# Patient Record
Sex: MALE | Race: WHITE | NOT HISPANIC OR LATINO | Employment: FULL TIME | URBAN - METROPOLITAN AREA
[De-identification: names, ages, dates, MRNs, and addresses within clinical notes are randomized per-mention and may not be internally consistent; named-entity substitution may affect disease eponyms.]

---

## 2019-09-18 ENCOUNTER — OFFICE VISIT (OUTPATIENT)
Dept: FAMILY MEDICINE CLINIC | Facility: CLINIC | Age: 49
End: 2019-09-18
Payer: COMMERCIAL

## 2019-09-18 VITALS
BODY MASS INDEX: 32.47 KG/M2 | DIASTOLIC BLOOD PRESSURE: 80 MMHG | WEIGHT: 253 LBS | HEART RATE: 80 BPM | SYSTOLIC BLOOD PRESSURE: 132 MMHG | TEMPERATURE: 98 F | OXYGEN SATURATION: 95 % | HEIGHT: 74 IN | RESPIRATION RATE: 18 BRPM

## 2019-09-18 DIAGNOSIS — G89.29 CHRONIC MIDLINE LOW BACK PAIN WITHOUT SCIATICA: Primary | ICD-10-CM

## 2019-09-18 DIAGNOSIS — M54.50 CHRONIC MIDLINE LOW BACK PAIN WITHOUT SCIATICA: Primary | ICD-10-CM

## 2019-09-18 DIAGNOSIS — Z12.11 SCREENING FOR COLON CANCER: ICD-10-CM

## 2019-09-18 DIAGNOSIS — Z12.5 SCREENING FOR PROSTATE CANCER: ICD-10-CM

## 2019-09-18 DIAGNOSIS — Z13.1 SCREENING FOR DIABETES MELLITUS: ICD-10-CM

## 2019-09-18 DIAGNOSIS — L40.9 PSORIASIS: ICD-10-CM

## 2019-09-18 DIAGNOSIS — Z13.220 NEED FOR LIPID SCREENING: ICD-10-CM

## 2019-09-18 PROCEDURE — 99204 OFFICE O/P NEW MOD 45 MIN: CPT | Performed by: FAMILY MEDICINE

## 2019-09-18 RX ORDER — TRIAMCINOLONE ACETONIDE 5 MG/G
CREAM TOPICAL 3 TIMES DAILY
Qty: 30 G | Refills: 2 | Status: SHIPPED | OUTPATIENT
Start: 2019-09-18 | End: 2020-01-03 | Stop reason: SDUPTHER

## 2019-09-18 NOTE — PROGRESS NOTES
BMI Counseling: Body mass index is 32 48 kg/m²  The BMI is above normal  Nutrition recommendations include reducing portion sizes
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
## 2019-09-28 ENCOUNTER — LAB (OUTPATIENT)
Dept: LAB | Facility: CLINIC | Age: 49
End: 2019-09-28
Payer: COMMERCIAL

## 2019-09-28 DIAGNOSIS — Z13.1 SCREENING FOR DIABETES MELLITUS: ICD-10-CM

## 2019-09-28 DIAGNOSIS — Z13.220 NEED FOR LIPID SCREENING: ICD-10-CM

## 2019-09-28 DIAGNOSIS — Z12.5 SCREENING FOR PROSTATE CANCER: ICD-10-CM

## 2019-09-28 LAB
ALBUMIN SERPL BCP-MCNC: 4.2 G/DL (ref 3.5–5)
ALP SERPL-CCNC: 83 U/L (ref 46–116)
ALT SERPL W P-5'-P-CCNC: 28 U/L (ref 12–78)
ANION GAP SERPL CALCULATED.3IONS-SCNC: 7 MMOL/L (ref 4–13)
AST SERPL W P-5'-P-CCNC: 17 U/L (ref 5–45)
BILIRUB SERPL-MCNC: 0.62 MG/DL (ref 0.2–1)
BUN SERPL-MCNC: 12 MG/DL (ref 5–25)
CALCIUM SERPL-MCNC: 9.1 MG/DL (ref 8.3–10.1)
CHLORIDE SERPL-SCNC: 109 MMOL/L (ref 100–108)
CHOLEST SERPL-MCNC: 169 MG/DL (ref 50–200)
CO2 SERPL-SCNC: 26 MMOL/L (ref 21–32)
CREAT SERPL-MCNC: 0.95 MG/DL (ref 0.6–1.3)
GFR SERPL CREATININE-BSD FRML MDRD: 92 ML/MIN/1.73SQ M
GLUCOSE P FAST SERPL-MCNC: 91 MG/DL (ref 65–99)
HDLC SERPL-MCNC: 38 MG/DL (ref 40–60)
LDLC SERPL CALC-MCNC: 105 MG/DL (ref 0–100)
NONHDLC SERPL-MCNC: 131 MG/DL
POTASSIUM SERPL-SCNC: 4.6 MMOL/L (ref 3.5–5.3)
PROT SERPL-MCNC: 7.5 G/DL (ref 6.4–8.2)
PSA SERPL-MCNC: 0.9 NG/ML (ref 0–4)
SODIUM SERPL-SCNC: 142 MMOL/L (ref 136–145)
TRIGL SERPL-MCNC: 132 MG/DL

## 2019-09-28 PROCEDURE — 80061 LIPID PANEL: CPT

## 2019-09-28 PROCEDURE — 80053 COMPREHEN METABOLIC PANEL: CPT

## 2019-09-28 PROCEDURE — 36415 COLL VENOUS BLD VENIPUNCTURE: CPT

## 2019-09-28 PROCEDURE — G0103 PSA SCREENING: HCPCS

## 2019-10-07 ENCOUNTER — EVALUATION (OUTPATIENT)
Dept: PHYSICAL THERAPY | Facility: CLINIC | Age: 49
End: 2019-10-07
Payer: COMMERCIAL

## 2019-10-07 DIAGNOSIS — M54.50 CHRONIC MIDLINE LOW BACK PAIN WITHOUT SCIATICA: ICD-10-CM

## 2019-10-07 DIAGNOSIS — G89.29 CHRONIC MIDLINE LOW BACK PAIN WITHOUT SCIATICA: ICD-10-CM

## 2019-10-07 PROCEDURE — 97162 PT EVAL MOD COMPLEX 30 MIN: CPT | Performed by: PHYSICAL THERAPIST

## 2019-10-07 PROCEDURE — 97110 THERAPEUTIC EXERCISES: CPT | Performed by: PHYSICAL THERAPIST
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
## 2019-10-10 ENCOUNTER — OFFICE VISIT (OUTPATIENT)
Dept: PHYSICAL THERAPY | Facility: CLINIC | Age: 49
End: 2019-10-10
Payer: COMMERCIAL

## 2019-10-10 DIAGNOSIS — M54.50 CHRONIC MIDLINE LOW BACK PAIN WITHOUT SCIATICA: Primary | ICD-10-CM

## 2019-10-10 DIAGNOSIS — G89.29 CHRONIC MIDLINE LOW BACK PAIN WITHOUT SCIATICA: Primary | ICD-10-CM

## 2019-10-10 PROCEDURE — 97110 THERAPEUTIC EXERCISES: CPT

## 2019-10-10 PROCEDURE — 97150 GROUP THERAPEUTIC PROCEDURES: CPT

## 2019-10-10 NOTE — PROGRESS NOTES
Daily Note     Today's date: 10/10/2019  Patient name: Maryam Santo  : 1970  MRN: [id number]  Referring provider: Caryle Finders, MD  Dx:   Encounter Diagnosis     ICD-10-CM    1  Chronic midline low back pain without sciatica M54 5     G89 29                   Subjective: Patient reports usual pain arriving to therapy today  Objective: See treatment diary below      Assessment: Tolerated treatment well  Intiated exercise POC this visit to tolerance  He did complain of radiating sx's into right arm with the UE lifts with red medicine ball which diminished with completion  Patient reported slight pain relief leaving therapy today  Will add in more exercises next  Visit  Patient demonstrated fatigue post treatment, exhibited good technique with therapeutic exercises and would benefit from continued PT      Plan: Continue per plan of care  Progress treatment as tolerated         Precautions: asthma      Manual  10- (IE) 10/10           MET to correct L Posterior innominate rotation NV            Rotational mobs at T8- T12 and L2/L3 NV            Piriformis pin and stretch on L NV RA                                         Exercise Diary  10- (IE) 10/10           NuStep 6 mins NV 6 min                        Manual first:  Laying:             - PPT 10 x 10" NV 10x10"           - TA  10 x 10" NV 10x10"           - TA with alt march 20 x 2" ea NV 20x  2" ea           - TA with MB lift overhead 2 x 10 RMB NV 2x10  RMB           - Sciatic N glide on L 10 x 2" NV 10x 2"                        TB: (Maintain TA contraction)             - rows             - pulldowns             - thoracic antirotation holds                          Standing:             - TB hip abd with ext             - TB hip ext             - front lunges             - lateral lunges                              Modalities  10-7 (IE)            Cryo as needed

## 2019-10-14 ENCOUNTER — OFFICE VISIT (OUTPATIENT)
Dept: PHYSICAL THERAPY | Facility: CLINIC | Age: 49
End: 2019-10-14
Payer: COMMERCIAL

## 2019-10-14 DIAGNOSIS — M54.50 CHRONIC MIDLINE LOW BACK PAIN WITHOUT SCIATICA: Primary | ICD-10-CM

## 2019-10-14 DIAGNOSIS — G89.29 CHRONIC MIDLINE LOW BACK PAIN WITHOUT SCIATICA: Primary | ICD-10-CM

## 2019-10-14 PROCEDURE — 97140 MANUAL THERAPY 1/> REGIONS: CPT

## 2019-10-14 PROCEDURE — 97112 NEUROMUSCULAR REEDUCATION: CPT

## 2019-10-14 PROCEDURE — 97110 THERAPEUTIC EXERCISES: CPT

## 2019-10-14 NOTE — PROGRESS NOTES
Daily Note     Today's date: 10/14/2019  Patient name: Zeenat Paula  : 1970  MRN: [id number]  Referring provider: Suma Littlejohn MD  Dx:   Encounter Diagnosis     ICD-10-CM    1  Chronic midline low back pain without sciatica M54 5     G89 29            [id number]   Pt was 1:1 with Jude Reagan DPT from 4:30-4:50pm and was 1:1 with treating clinician for rest of session  Subjective: Patient states he is doing okay today  Reports 3/10 pain in lumbar region today  Objective: See treatment diary below      Assessment: Tolerated treatment well  Progressed patient through exercises this visit  Introduced standing exercises with good tolerance and no increase in sx's  Provided cues for correct technique with hip extension exercise due to compensation of trunk flexion  Also introduced theraband exercise this visit, he did require postural cuing  Noted feeling good when leaving therapy today  Patient demonstrated fatigue post treatment, exhibited good technique with therapeutic exercises and would benefit from continued PT      Plan: Continue per plan of care  Progress treatment as tolerated         Precautions: asthma      Manual  10-7 (IE) 10/10 10/14          MET to correct L Posterior innominate rotation NV            Rotational mobs at T8- T12 and L2/L3 NV            Piriformis pin and stretch on L NV RA RA                                        Exercise Diary  10-7 (IE) 10/10 10/14          NuStep 6 mins NV 6 min 8 min                       Manual first:  Laying:             - PPT 10 x 10" NV 10x10" 10x10"          - TA  10 x 10" NV 10x10" 10x10"          - TA with alt march 20 x 2" ea NV 20x  2" ea 20x  2"  ea          - TA with MB lift overhead 2 x 10 RMB NV 2x10  RMB 3x10  RMB          - Sciatic N glide on L 10 x 2" NV 10x 2" 10x2"                       TB: (Maintain TA contraction)             - rows   GTB  2x10          - pulldowns   GTB  2x10          - thoracic antirotation holds   NV Standing:             - TB hip abd with ext   2x10  GTB          - TB hip ext   2x10  GTB          - front lunges   x10  ea          - lateral lunges   x10ea                           Modalities  10-7 (IE)            Cryo as needed

## 2019-10-17 ENCOUNTER — OFFICE VISIT (OUTPATIENT)
Dept: PHYSICAL THERAPY | Facility: CLINIC | Age: 49
End: 2019-10-17
Payer: COMMERCIAL

## 2019-10-17 DIAGNOSIS — M54.50 CHRONIC MIDLINE LOW BACK PAIN WITHOUT SCIATICA: Primary | ICD-10-CM

## 2019-10-17 DIAGNOSIS — G89.29 CHRONIC MIDLINE LOW BACK PAIN WITHOUT SCIATICA: Primary | ICD-10-CM

## 2019-10-17 PROCEDURE — 97140 MANUAL THERAPY 1/> REGIONS: CPT | Performed by: PHYSICAL THERAPIST

## 2019-10-17 PROCEDURE — 97110 THERAPEUTIC EXERCISES: CPT | Performed by: PHYSICAL THERAPIST

## 2019-10-17 NOTE — PROGRESS NOTES
Daily Note     Today's date: 10/17/2019  Patient name: Marlene Jordan  : 1970  MRN: [id number]  Referring provider: Yaritza Campbell MD  Dx:   Encounter Diagnosis     ICD-10-CM    1  Chronic midline low back pain without sciatica M54 5     G89 29            Subjective: States that he's feeling good today, notices improvement  Objective: See treatment diary below      Assessment: Completed exercise with correct technique and without reports of pain  Demonstrated moderate fatigue after exercise  Pt would benefit from continued PT services to reduce pain and increase level of function  Plan: Continue per plan of care  Progress treatment as tolerated         Precautions: asthma      Manual  10- (IE) 10/10 10/14 10/17         MET to correct L Posterior innominate rotation NV            Rotational mobs at T8- T12 and L2/L3 NV   MM         Piriformis pin and stretch on L NV RA RA MM                                       Exercise Diary  10- (IE) 10/10 10/14 10/17         NuStep 6 mins NV 6 min 8 min Bike, 5'                      Manual first:  Laying:             - PPT 10 x 10" NV 10x10" 10x10" HEP         - TA  10 x 10" NV 10x10" 10x10" HEP         - TA with alt march 20 x 2" ea NV 20x  2" ea 20x  2"  ea 20x2" ea         - TA with MB lift overhead 2 x 10 RMB NV 2x10  RMB 3x10  RMB 3x10 YMB         - Sciatic N glide on L 10 x 2" NV 10x 2" 10x2" 10x2"                      TB: (Maintain TA contraction)             - rows   GTB  2x10 GTB 2x10         - pulldowns   GTB  2x10 GTB 2x10         - thoracic antirotation holds   NV GTB 10x5" each                      Standing:             - TB hip abd with ext   2x10  GTB 2x10 BTB         - TB hip ext   2x10  GTB 2x10 BTB         - front lunges   x10  ea x10 ea         - lateral lunges   x10ea x10 ea                          Modalities  10-7 (IE)            Cryo as needed

## 2019-10-21 ENCOUNTER — OFFICE VISIT (OUTPATIENT)
Dept: PHYSICAL THERAPY | Facility: CLINIC | Age: 49
End: 2019-10-21
Payer: COMMERCIAL

## 2019-10-21 DIAGNOSIS — M54.50 CHRONIC MIDLINE LOW BACK PAIN WITHOUT SCIATICA: Primary | ICD-10-CM

## 2019-10-21 DIAGNOSIS — G89.29 CHRONIC MIDLINE LOW BACK PAIN WITHOUT SCIATICA: Primary | ICD-10-CM

## 2019-10-21 PROCEDURE — 97110 THERAPEUTIC EXERCISES: CPT | Performed by: PHYSICAL THERAPIST

## 2019-10-21 PROCEDURE — 97140 MANUAL THERAPY 1/> REGIONS: CPT | Performed by: PHYSICAL THERAPIST

## 2019-10-21 NOTE — PROGRESS NOTES
Daily Note     Today's date: 10/21/2019  Patient name: Dontrell Chavez  : 1970  MRN: [id number]  Referring provider: Genny Grady MD  Dx:   Encounter Diagnosis     ICD-10-CM    1  Chronic midline low back pain without sciatica M54 5     G89 29            Subjective: States that his back is feeling okay today  Objective: See treatment diary below      Assessment: Able to complete exercise progressions without reports of pain and demonstrated moderate fatigue after exercise  Pt would benefit from continued PT services to reduce pain and increase level of function  Plan: Continue per plan of care  Progress treatment as tolerated         Precautions: asthma      Manual  10- (IE) 10/10 10/14 10/17 10/21        MET to correct L Posterior innominate rotation NV            Rotational mobs at T8- T12 and L2/L3 NV   MM MM        Piriformis pin and stretch on L NV RA RA MM                                       Exercise Diary  10- (IE) 10/10 10/14 10/17 10/21        NuStep 6 mins NV 6 min 8 min Bike, 5'                      Manual first:  Laying:             - PPT 10 x 10" NV 10x10" 10x10" HEP         - TA  10 x 10" NV 10x10" 10x10" HEP         - TA with alt march 20 x 2" ea NV 20x  2" ea 20x  2"  ea 20x2" ea 20"x2 ea        - TA with MB lift overhead 2 x 10 RMB NV 2x10  RMB 3x10  RMB 3x10 YMB 3x10 YMB        - Sciatic N glide on L 10 x 2" NV 10x 2" 10x2" 10x2" 10x2"        TA w/ alt UE     20"x2        TB: (Maintain TA contraction)             - rows   GTB  2x10 GTB 2x10 GTB 2x10        - pulldowns   GTB  2x10 GTB 2x10 GTB 2x10        - thoracic antirotation holds   NV GTB 10x5" each GTB 15x5" each                     Standing:             - TB hip abd with ext   2x10  GTB 2x10 BTB 2x10x3" GTB        - TB hip ext   2x10  GTB 2x10 BTB 2x10x3" GTB        - front lunges   x10  ea x10 ea W/ BMB x10 ea        - lateral lunges   x10ea x10 ea W/ BMB x10 ea                         Modalities  10- (IE) Cryo as needed

## 2019-10-22 ENCOUNTER — OFFICE VISIT (OUTPATIENT)
Dept: GASTROENTEROLOGY | Facility: CLINIC | Age: 49
End: 2019-10-22
Payer: COMMERCIAL

## 2019-10-22 VITALS
SYSTOLIC BLOOD PRESSURE: 132 MMHG | HEIGHT: 74 IN | HEART RATE: 82 BPM | DIASTOLIC BLOOD PRESSURE: 76 MMHG | WEIGHT: 251.8 LBS | BODY MASS INDEX: 32.31 KG/M2

## 2019-10-22 DIAGNOSIS — Z12.11 SCREENING FOR COLON CANCER: Primary | ICD-10-CM

## 2019-10-22 PROCEDURE — 99204 OFFICE O/P NEW MOD 45 MIN: CPT | Performed by: INTERNAL MEDICINE
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
## 2019-10-23 ENCOUNTER — TELEPHONE (OUTPATIENT)
Dept: GASTROENTEROLOGY | Facility: CLINIC | Age: 49
End: 2019-10-23

## 2019-10-24 ENCOUNTER — TELEPHONE (OUTPATIENT)
Dept: GASTROENTEROLOGY | Facility: CLINIC | Age: 49
End: 2019-10-24

## 2019-10-24 ENCOUNTER — OFFICE VISIT (OUTPATIENT)
Dept: PHYSICAL THERAPY | Facility: CLINIC | Age: 49
End: 2019-10-24
Payer: COMMERCIAL

## 2019-10-24 DIAGNOSIS — M54.50 CHRONIC MIDLINE LOW BACK PAIN WITHOUT SCIATICA: Primary | ICD-10-CM

## 2019-10-24 DIAGNOSIS — G89.29 CHRONIC MIDLINE LOW BACK PAIN WITHOUT SCIATICA: Primary | ICD-10-CM

## 2019-10-24 PROCEDURE — 97140 MANUAL THERAPY 1/> REGIONS: CPT | Performed by: PHYSICAL THERAPIST

## 2019-10-24 PROCEDURE — 97110 THERAPEUTIC EXERCISES: CPT | Performed by: PHYSICAL THERAPIST

## 2019-10-24 PROCEDURE — 97112 NEUROMUSCULAR REEDUCATION: CPT | Performed by: PHYSICAL THERAPIST

## 2019-10-24 NOTE — PROGRESS NOTES
Daily Note     Today's date: 10/24/2019  Patient name: Ijeoma Moody  : 1970  MRN: [id number]  Referring provider: Margurite Cabot, MD  Dx:   Encounter Diagnosis     ICD-10-CM    1  Chronic midline low back pain without sciatica M54 5     G89 29                   Subjective: Patient reports his lower back is feeling better; however, his lower thoracic spine is feeling a little worse  Objective: See treatment diary below      Assessment: Progressed exercises this session to include increased sets of strengthening exercises and progressed resistance on standing core stabilization exercises  Tolerated treatment well  Patient demonstrated fatigue post treatment, exhibited good technique with therapeutic exercises and would benefit from continued PT  Plan: Progress treatment as tolerated         Precautions: asthma      Manual  10- (IE) 10/10 10/14 10/17 10/21 10-24       MET to correct L Posterior innominate rotation NV            Rotational mobs at T8- T12 and L2/L3 NV   MM MM JG grade IV       Piriformis pin and stretch on L NV RA RA MM                                       Exercise Diary  10-7 (IE) 10/10 10/14 10/17 10/21 10-24       NuStep 6 mins NV 6 min 8 min Bike, 5'  8 mins, L6 Add UE this visit                   Manual first:  Laying:             - PPT 10 x 10" NV 10x10" 10x10" HEP         - TA  10 x 10" NV 10x10" 10x10" HEP         - TA with alt march 20 x 2" ea NV 20x  2" ea 20x  2"  ea 20x2" ea 20"x2 ea NV       - TA with MB lift overhead 2 x 10 RMB NV 2x10  RMB 3x10  RMB 3x10 YMB 3x10 YMB NV       - Sciatic N glide on L 10 x 2" NV 10x 2" 10x2" 10x2" 10x2" NV       TA w/ alt UE     20"x2 NV                    TB: (Maintain TA contraction)             - rows   GTB  2x10 GTB 2x10 GTB 2x10 Strong: 3 x 10 15#       - pulldowns   GTB  2x10 GTB 2x10 GTB 2x10 Cookie: 3 x 10 15#       - thoracic antirotation holds   NV GTB 10x5" each GTB 15x5" each Strong: 15x 5" hold 5# Standing:             - TB hip abd with ext   2x10  GTB 2x10 BTB 2x10x3" GTB 3 x 10 ea GTB       - TB hip ext   2x10  GTB 2x10 BTB 2x10x3" GTB 3 x 10 ea GTB       - front lunges   x10  ea x10 ea W/ BMB x10 ea 2 x 10 ea with BMB       - lateral lunges   x10ea x10 ea W/ BMB x10 ea 2 x 10 ea with BMB                        Modalities  10-7 (IE)            Cryo as needed

## 2019-10-28 ENCOUNTER — OFFICE VISIT (OUTPATIENT)
Dept: PHYSICAL THERAPY | Facility: CLINIC | Age: 49
End: 2019-10-28
Payer: COMMERCIAL

## 2019-10-28 DIAGNOSIS — M54.50 CHRONIC MIDLINE LOW BACK PAIN WITHOUT SCIATICA: Primary | ICD-10-CM

## 2019-10-28 DIAGNOSIS — G89.29 CHRONIC MIDLINE LOW BACK PAIN WITHOUT SCIATICA: Primary | ICD-10-CM

## 2019-10-28 PROCEDURE — 97112 NEUROMUSCULAR REEDUCATION: CPT | Performed by: PHYSICAL THERAPIST

## 2019-10-28 PROCEDURE — 97140 MANUAL THERAPY 1/> REGIONS: CPT | Performed by: PHYSICAL THERAPIST

## 2019-10-28 NOTE — PROGRESS NOTES
Daily Note     Today's date: 10/28/2019  Patient name: La Kidd  : 1970  MRN: [id number]  Referring provider: Isa Fajardo MD  Dx:   Encounter Diagnosis     ICD-10-CM    1  Chronic midline low back pain without sciatica M54 5     G89 29                   Subjective: Patient reports he was able to perform his home improvements this weekend without much discomfort in his lower back  Pt states his lower thoracic spine is causing him more pain then his lower back recently  Objective: See treatment diary below      Assessment: Progressed exercises this session on the [employer] machine to focus on thoraco-lumbar dissociation  Pt required mild verbal and tactile cues to maintain florence lumbar spine while moving thoracic spine  Improvement in form noted during front and lateral lunges  Pt unable to complete all exercises this date due to his personal time constraints  Tolerated treatment well  Patient demonstrated fatigue post treatment and would benefit from continued PT      Plan: Progress treatment as tolerated         Precautions: asthma      Manual  10- (IE) 10/10 10/14 10/17 10/21 10-24 10-28      MET to correct L Posterior innominate rotation NV            Rotational mobs at T8- T12 and L2/L3 NV   MM MM JG grade IV JG grade IV      Piriformis pin and stretch on L NV RA RA MM                                       Exercise Diary  10- (IE) 10/10 10/14 10/17 10/21 10-24 10-28      NuStep 6 mins NV 6 min 8 min Bike, 5'  8 mins, L6 8mins, L6                   Manual first:  Laying:             - PPT 10 x 10" NV 10x10" 10x10" HEP         - TA  10 x 10" NV 10x10" 10x10" HEP         - TA with alt march 20 x 2" ea NV 20x  2" ea 20x  2"  ea 20x2" ea 20"x2 ea NV       - TA with MB lift overhead 2 x 10 RMB NV 2x10  RMB 3x10  RMB 3x10 YMB 3x10 YMB NV       - Sciatic N glide on L 10 x 2" NV 10x 2" 10x2" 10x2" 10x2" NV       TA w/ alt UE     20"x2 NV                    TB: (Maintain TA contraction)             - rows   GTB  2x10 GTB 2x10 GTB 2x10 Busy: 3 x 10 15# Busy: 3 x 10 25#      - pulldowns   GTB  2x10 GTB 2x10 GTB 2x10 Cookie: 3 x 10 15# Busy: 3 x 10 25#      - thoracic antirotation holds   NV GTB 10x5" each GTB 15x5" each Busy: 15x 5" hold 5# DC      - thoracic antirotation walks out (5' out, 5' in)       Busy: 10 x ea 15#      - Cookie thoracic rotation (florence lumbar)       2 x 10 8 #                                Standing:             - TB hip abdwith ext   2x10  GTB 2x10 BTB 2x10x3" GTB 3 x 10 ea GTB NV      - TB hip ext    2x10  GTB 2x10 BTB 2x10x3" GTB 3 x 10 ea GTB NV      - front lunges   x10  ea x10 ea W/ BMB x10 ea 2 x 10 ea with BMB 2 x 10 ea with BMB      - lateral lunges   x10ea x10 ea W/ BMB x10 ea 2 x 10 ea with BMB 2 x 10 ea with BMB                       Modalities  10-7 (IE)            Cryo as needed                                          * 1:1 time this date from 4:15pm- 4:50pm only

## 2019-10-31 ENCOUNTER — OFFICE VISIT (OUTPATIENT)
Dept: PHYSICAL THERAPY | Facility: CLINIC | Age: 49
End: 2019-10-31
Payer: COMMERCIAL

## 2019-10-31 DIAGNOSIS — G89.29 CHRONIC MIDLINE LOW BACK PAIN WITHOUT SCIATICA: Primary | ICD-10-CM

## 2019-10-31 DIAGNOSIS — M54.50 CHRONIC MIDLINE LOW BACK PAIN WITHOUT SCIATICA: Primary | ICD-10-CM

## 2019-10-31 PROCEDURE — 97140 MANUAL THERAPY 1/> REGIONS: CPT | Performed by: PHYSICAL THERAPIST

## 2019-10-31 PROCEDURE — 97112 NEUROMUSCULAR REEDUCATION: CPT

## 2019-10-31 PROCEDURE — 97110 THERAPEUTIC EXERCISES: CPT

## 2019-10-31 NOTE — PROGRESS NOTES
Daily Note     Today's date: 10/31/2019  Patient name: Marlene Jordan  : 1970  MRN: [id number]  Referring provider: Yaritza Campbell MD  Dx:   Encounter Diagnosis     ICD-10-CM    1  Chronic midline low back pain without sciatica M54 5     G89 29        Start Time:   Stop Time: 1838  Total time in clinic (min): 53 minutes   1 on 1 time from 6:00pm to 6:30pm, 6:30pm to 06:38pm  JG time with mobs  Subjective: Pt reported feeling pretty good having no pain currently in his lower back  Objective: See treatment diary below      Assessment:  Continued with treatment plan, Patient able to complete all exercises with proper technique and form  No pain mentioned with exercises performed  JG performed mobs on patient,  Tolerated treatment well  Patient demonstrated fatigue post treatment, exhibited good technique with therapeutic exercises and would benefit from continued PT      Plan: Continue per plan of care        Precautions: asthma      Manual  10- (IE) 10/10 10/14 10/17 10/21 10-24 10-28 10/31     MET to correct L Posterior innominate rotation NV            Rotational mobs at T8- T12 and L2/L3 NV   MM MM JG grade IV JG grade IV 8 min JG      Piriformis pin and stretch on L NV RA RA MM                                       Exercise Diary  10- (IE) 10/10 10/14 10/17 10/21 10-24 10-28 10/31    NuStep 6 mins NV 6 min 8 min Bike, 5'  8 mins, L6 8mins, L6 L6 8 min                 Manual first:  Laying:            - PPT 10 x 10" NV 10x10" 10x10" HEP        - TA  10 x 10" NV 10x10" 10x10" HEP        - TA with alt march 20 x 2" ea NV 20x  2" ea 20x  2"  ea 20x2" ea 20"x2 ea NV      - TA with MB lift overhead 2 x 10 RMB NV 2x10  RMB 3x10  RMB 3x10 YMB 3x10 YMB NV      - Sciatic N glide on L 10 x 2" NV 10x 2" 10x2" 10x2" 10x2" NV      TA w/ alt UE     20"x2 NV                  TB: (Maintain TA contraction)            - rows   GTB  2x10 GTB 2x10 GTB 2x10 Cookie: 3 x 10 15# Cookie: 3 x 10 25# [place] 3x 10 25# - pulldowns   GTB  2x10 GTB 2x10 GTB 2x10 [place]: 3 x 10 15# [place]: 3 x 10 25# [place] 3x 10 25#     - thoracic antirotation holds   NV GTB 10x5" each GTB 15x5" each Cookie: 15x 5" hold 5# DC DC    - thoracic antirotation walks out (5' out, 5' in)       [place]: 10 x ea 15# [place] 10x 15#    - [place] thoracic rotation (florence lumbar)       2 x 10 8 #                             Standing:            - TB hip abdwith ext   2x10  GTB 2x10 BTB 2x10x3" GTB 3 x 10 ea GTB NV     - TB hip ext    2x10  GTB 2x10 BTB 2x10x3" GTB 3 x 10 ea GTB NV     - front lunges   x10  ea x10 ea W/ BMB x10 ea 2 x 10 ea with BMB 2 x 10 ea with BMB 2x 10 ea BMB    - lateral lunges   x10ea x10 ea W/ BMB x10 ea 2 x 10 ea with BMB 2 x 10 ea with BMB 2x 10 BMB                    Modalities  10-7 (IE)            Cryo as needed

## 2019-11-04 ENCOUNTER — EVALUATION (OUTPATIENT)
Dept: PHYSICAL THERAPY | Facility: CLINIC | Age: 49
End: 2019-11-04
Payer: COMMERCIAL

## 2019-11-04 DIAGNOSIS — G89.29 CHRONIC MIDLINE LOW BACK PAIN WITHOUT SCIATICA: Primary | ICD-10-CM

## 2019-11-04 DIAGNOSIS — M54.50 CHRONIC MIDLINE LOW BACK PAIN WITHOUT SCIATICA: Primary | ICD-10-CM

## 2019-11-04 PROCEDURE — 97110 THERAPEUTIC EXERCISES: CPT | Performed by: PHYSICAL THERAPIST

## 2019-11-04 PROCEDURE — 97112 NEUROMUSCULAR REEDUCATION: CPT | Performed by: PHYSICAL THERAPIST
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
## 2019-11-07 ENCOUNTER — LAB REQUISITION (OUTPATIENT)
Dept: LAB | Facility: HOSPITAL | Age: 49
End: 2019-11-07
Payer: COMMERCIAL

## 2019-11-07 ENCOUNTER — APPOINTMENT (OUTPATIENT)
Dept: PHYSICAL THERAPY | Facility: CLINIC | Age: 49
End: 2019-11-07
Payer: COMMERCIAL

## 2019-11-07 DIAGNOSIS — K52.839 MICROSCOPIC COLITIS, UNSPECIFIED: ICD-10-CM

## 2019-11-07 PROCEDURE — 88305 TISSUE EXAM BY PATHOLOGIST: CPT | Performed by: PATHOLOGY

## 2019-11-08 ENCOUNTER — TELEPHONE (OUTPATIENT)
Dept: GASTROENTEROLOGY | Facility: CLINIC | Age: 49
End: 2019-11-08

## 2019-11-08 NOTE — TELEPHONE ENCOUNTER
----- Message from Rita Pina MD sent at 11/8/2019 10:44 AM EST -----  pls tell him the polyp was benign; pls tell him the colon lining looked normal under the microscope

## 2019-11-11 ENCOUNTER — APPOINTMENT (OUTPATIENT)
Dept: PHYSICAL THERAPY | Facility: CLINIC | Age: 49
End: 2019-11-11
Payer: COMMERCIAL

## 2019-11-14 ENCOUNTER — APPOINTMENT (OUTPATIENT)
Dept: PHYSICAL THERAPY | Facility: CLINIC | Age: 49
End: 2019-11-14
Payer: COMMERCIAL

## 2019-11-18 ENCOUNTER — APPOINTMENT (OUTPATIENT)
Dept: PHYSICAL THERAPY | Facility: CLINIC | Age: 49
End: 2019-11-18
Payer: COMMERCIAL

## 2019-11-21 ENCOUNTER — APPOINTMENT (OUTPATIENT)
Dept: PHYSICAL THERAPY | Facility: CLINIC | Age: 49
End: 2019-11-21
Payer: COMMERCIAL

## 2020-01-03 DIAGNOSIS — L40.9 PSORIASIS: ICD-10-CM

## 2020-01-03 RX ORDER — TRIAMCINOLONE ACETONIDE 5 MG/G
CREAM TOPICAL 3 TIMES DAILY
Qty: 30 G | Refills: 0 | Status: SHIPPED | OUTPATIENT
Start: 2020-01-03

## 2020-03-10 DIAGNOSIS — L40.9 PSORIASIS: Primary | ICD-10-CM

## 2020-07-02 ENCOUNTER — TELEMEDICINE (OUTPATIENT)
Dept: DERMATOLOGY | Facility: CLINIC | Age: 50
End: 2020-07-02
Payer: COMMERCIAL

## 2020-07-02 DIAGNOSIS — L40.9 PSORIASIS: Primary | ICD-10-CM

## 2020-07-02 PROCEDURE — 99204 OFFICE O/P NEW MOD 45 MIN: CPT | Performed by: DERMATOLOGY

## 2020-07-02 RX ORDER — HALOBETASOL PROPIONATE 0.05 %
OINTMENT (GRAM) TOPICAL
Qty: 50 G | Refills: 3 | Status: SHIPPED | OUTPATIENT
Start: 2020-07-02 | End: 2020-07-12

## 2020-07-02 RX ORDER — CALCIPOTRIENE 50 UG/G
OINTMENT TOPICAL
Qty: 60 G | Refills: 11 | Status: SHIPPED | OUTPATIENT
Start: 2020-07-02 | End: 2020-08-03
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
## 2020-07-02 NOTE — PATIENT INSTRUCTIONS
PSORIASIS    Assessment and Plan:  Based on a thorough discussion of this condition and the management approach to it (including a comprehensive discussion of the known risks, side effects and potential benefits of treatment), the patient (family) agrees to implement the following specific plan:   Start Halobetasol:  Apply topically to elbows and knees twice a day, Monday through Friday only for one month, do not use on the weekends   Start Calcipotriene: Apply topically twice a day to elbows and knees every Saturday and Sunday for one month   Follow up in the office in 4-6 weeks     Psoriasis is a chronic inflammatory condition that causes the body to make new skin cells in days rather than weeks  As these cells pile up on the surface of the skin, you may see thick, scaly patches of thickened skin  Psoriasis affects 2-4% of males and females  It can start at any age including childhood, with peaks of onset at 15-25 years and 50-60 years  It tends to persist lifelong, fluctuating in extent and severity  It is particularly common in Caucasians but may affect people of any race  About one-third of patients with psoriasis have family members with psoriasis  Psoriasis is multifactorial  It is classified as an immune-mediated inflammatory disease (IMID)  Genetic factors are important and influence the type of psoriasis and response to treatment  What are the signs and symptoms of psoriasis? There are many different types of psoriasis that each have present uniquely  The types of psoriasis include:    Plaque psoriasis: About 80% to 90% of people who have psoriasis develop this type  When plaque psoriasis appears, you may see:  Plaque psoriasis usually presents with symmetrically distributed, red, scaly plaques with well-defined edges  The scale is typically silvery white, except in skin folds where the plaques often appear shiny and they may have a moist peeling surface   The most common sites are scalp, elbows and knees, but any part of the skin can be involved  The plaques are usually very persistent without treatment  Itch is mostly mild but may be severe in some patients, leading to scratching and lichenification (thickened leathery skin with increased skin markings)  Painful skin cracks or fissures may occur  When psoriatic plaques clear up, they may leave brown or pale marks that can be expected to fade over several months  Guttate psoriasis: When someone gets this type of psoriasis, you often see tiny bumps appear on the skin quite suddenly  The bumps tend to cover much of the torso, legs, and arms  Sometimes, the bumps also develop on the face, scalp, and ears  No matter where they appear, the bumps tend to be:    Small and scaly   [place]-colored to pink   Temporary, clearing in a few weeks or months without treatment  When guttate psoriasis clears, it may never return  Why this happens is still a bit of a mystery  Guttate psoriasis tends to develop in children and young adults who've had an infection, such as strep throat  It's possible that when the infection clears so does guttate psoriasis  It's also possible to have:   Guttate psoriasis for life   See the guttate psoriasis clear and plaque psoriasis develop later in life   Plaque psoriasis when you develop guttate psoriasis  There's no way to predict what will happen after the first flare-up of guttate psoriasis clears  Inverse psoriasis: This type of psoriasis develops in areas where skin touches skin, such as the armpits, genitals, and crease of the buttocks  Where the inverse psoriasis appears, you're likely to notice:   Smooth, red patches of skin that look raw   Little, if any, silvery-white coating   Sore or painful skin  Other names for this type of psoriasis are intertriginous psoriasis or flexural psoriasis  Pustular psoriasis: This type of psoriasis causes pus-filled bumps that usually appear only on the feet and hands  While the pus-filled bumps may look like an infection, the skin is not infected  The bumps don't contain bacteria or anything else that could cause an infection  Where pustular psoriasis appears, you tend to notice:   Red, swollen skin that is dotted with pus-filled bumps   Extremely sore or painful skin   Brown dots (and sometimes scale) appear as the pus-filled bumps dry  Pustular psoriasis can make just about any activity that requires your hands or feet, such as typing or walking, unbearably painful  Pustular psoriasis (generalized): Serious and life-threatening, this rare type of psoriasis causes pus-filled bumps to develop on much of the skin  Also called von Zumbusch psoriasis, a flare-up causes this sequence of events:  1  Skin on most of the body suddenly turns dry, red, and tender  2  Within hours, pus-filled bumps cover most of the skin  3  Often within a day, the pus-filled bumps break open and pools of pus leak onto the skin  4  As the pus dries (usually within 24 to 48 hours), the skin dries out and peels (as shown in this picture)  5  When the dried skin peels off, you see a smooth, glazed surface  6  In a few days or weeks, you may see a new crop of pus-filled bumps covering most of the skin, as the cycle repeats itself  Anyone with pustular psoriasis also feels very sick, and may develop a fever, headache, muscle weakness, and other symptoms  Medical care is often necessary to save the person's life  Erythrodermic psoriasis: Serious and life-threatening, this type of psoriasis requires immediate medical care  When someone develops erythrodermic psoriasis, you may notice:   Skin on most of the body looks burnt   Chills, fever, and the person looks extremely ill   Muscle weakness, a rapid pulse, and severe itch  The person may also be unable to keep warm, so hypothermia can set in quickly  Most people who develop this type of psoriasis already have another type of psoriasis   Before developing erythrodermic psoriasis, they often notice that their psoriasis is worsening or not improving with treatment  If you notice either of these happening, see a board-certified dermatologist     Nails    Nail psoriasis: With any type of psoriasis, you may see changes to your fingernails or toenails  About half of the people who have plaque psoriasis see signs of psoriasis on their fingernails at some point2  When psoriasis affects the nails, you may notice:   Tiny dents in your nails (called nail pits)   White, yellow, or brown discoloration under one or more nails   Crumbling, rough nails   A nail lifting up so that it's no longer attached   Buildup of skin cells beneath one or more nails, which lifts up the nail  Treatment and proper nail care can help you control nail psoriasis  Psoriatic arthritis: If you have psoriasis, it's important to pay attention to your joints  Some people who have psoriasis develop a type of arthritis called psoriatic arthritis  This is more likely to occur if you have severe psoriasis  Most people notice psoriasis on their skin years before they develop psoriatic arthritis  It's also possible to get psoriatic arthritis before psoriasis, but this is less common  When psoriatic arthritis develops, the signs can be subtle  At first, you may notice:   A swollen and tender joint, especially in a finger or toe   Heel pain   Swelling on the back of your leg, just above your heel   Stiffness in the morning that fades during the day  Like psoriasis, psoriatic arthritis cannot be cured  Treatment can prevent psoriatic arthritis from worsening, which is important  Allowed to progress, psoriatic arthritis can become disabling  Diagnosis and treatment of psoriasis   Psoriasis is usually diagnosed by clinical features, and skin biopsy if necessary  It is important to decrease factors that aggravate psoriasis   These include treating streptococcal infections, minimizing skin injuries, avoiding sun exposure if it exacerbates psoriasis, smoking, alcohol usage, decreasing stress, and maintaining an optimal body weight  Certain medications such as lithium, beta blockers, antimalarials, and NSAIDs have also been implicated  Suddenly stopping oral steroids or strong topical steroids can cause rebound disease  There are many categories of psoriasis treatments available  Topical therapy  Mild psoriasis is generally treated with topical agents alone  Which treatment is selected may depend on body site, extent and severity of psoriasis   Emollients   Coal tar preparations   Dithranol   Salicylic acid   Vitamin D analogue (calcipotriol)   Topical corticosteroids   Calcineurin inhibitor (tacrolimus, pimecrolimus)  Phototherapy  Most psoriasis centres offer phototherapy with ultraviolet (UV) radiation, often in combination with topical or systemic agents  Types of phototherapy include   Narrowband UVB   Broadband UVB   Photochemotherapy (PUVA)   Targeted phototherapy  Systemic therapy  Moderate to severe psoriasis warrants treatment with a systemic agent and/or phototherapy  The most common treatments are:   Methotrexate   Ciclosporin   Acitretin  Other medicines occasionally used for psoriasis include:   Mycophenolate   Apremilast   Hydroxyurea   Azathioprine   6-mercaptopurine  Systemic corticosteroids are best avoided due to a risk of severe withdrawal flare of psoriasis and adverse effects  Biologics or targeted therapies are reserved for conventional treatment-resistant severe psoriasis, mainly because of expense, as side effects compare favorably with other systemic agents   These include:   Anti-tumour necrosis factor-alpha antagonists (anti-TNF?) infliximab, adalimumab and etanercept   The interleukin (IL)-12/23 antagonist ustekinumab   IL-17 antagonists such as secukinumab  Many other monoclonal antibodies are under investigation in the treatment of psoriasis

## 2020-08-04 ENCOUNTER — OFFICE VISIT (OUTPATIENT)
Dept: DERMATOLOGY | Facility: CLINIC | Age: 50
End: 2020-08-04
Payer: COMMERCIAL

## 2020-08-04 VITALS — TEMPERATURE: 98.5 F

## 2020-08-04 DIAGNOSIS — L40.9 PSORIASIS: Primary | ICD-10-CM

## 2020-08-04 PROCEDURE — 1036F TOBACCO NON-USER: CPT | Performed by: DERMATOLOGY

## 2020-08-04 PROCEDURE — 99212 OFFICE O/P EST SF 10 MIN: CPT | Performed by: DERMATOLOGY

## 2020-08-04 NOTE — PROGRESS NOTES
[facility] Follow Up Note    Patient Name: Jeremias Roca  Encounter Date: 08/04/2020    Today's Chief Concerns:  [facility] Concern #1:  Follow up psoriasis     Current Medications:    Current Outpatient Medications:     calcipotriene (DOVONOX) 0 005 % ointment, Apply topically to elbows and knees twice a day every Saturday and Sunday for one month , Disp: 60 g, Rfl: 11    halobetasol (ULTRAVATE) 0 05 % ointment, Apply topically to elbows and knees twice a day Monday through Friday only for one month, do not use on the weekends  , Disp: 50 g, Rfl: 3    triamcinolone (KENALOG) 0 5 % cream, Apply topically 3 (three) times a day Apply to both elbows and knees (Patient not taking: Reported on 7/2/2020), Disp: 30 g, Rfl: 0    CONSTITUTIONAL:   Vitals:    08/04/20 0944   Temp: 98 5 °F (36 9 °C)   TempSrc: Tympanic         Specific Alerts:    Have you been seen by a [facility] Dermatologist in the last 3 years? YES    Are you pregnant or planning to become pregnant? No    Are you currently or planning to be nursing or breast feeding? No    Allergies   Allergen Reactions    Penicillins        May we call your Preferred Phone number to discuss your specific medical information? YES    May we leave a detailed message that includes your specific medical information? YES    Have you traveled outside of the [facility] in the past 3 months? No    Do you currently have a pacemaker or defibrillator? No    Do you have any artificial heart valves, joints, plates, screws, rods, stents, pins, etc? No   - If Yes, were any placed within the last 2 years? Do you require any medications prior to a surgical procedure? No   - If Yes, for which procedure? n/a   - If Yes, what medications to you require? n/a    Are you taking any medications that cause you to bleed more easily ("blood thinners") No    Have you ever experienced a rapid heartbeat with epinephrine?  No    Have you ever been treated with "gold" (gold sodium thiomalate) therapy? No    [facility] can help with wrinkles, "laugh lines," facial volume loss, "double chin," "love handles," age spots, and more  Are you interested in learning today about some of the skin enhancement procedures that we offer? (If Yes, please provide more detail) No    Review of Systems:  Have you recently had or currently have any of the following? · Fever or chills: No  · Night Sweats: No  · Headaches: No  · Weight Gain: No  · Weight Loss: No  · Blurry Vision: No  · Nausea: No  · Vomiting: No  · Diarrhea: No  · Blood in Stool: No  · Abdominal Pain: No  · Itchy Skin: YES  · Painful Joints: YES  · Swollen Joints: YES  · Muscle Pain: YES  · Irregular Mole: No  · Sun Burn: No  · Dry Skin: No  · Skin Color Changes: No  · Scar or Keloid: No  · Cold Sores/Fever Blisters: No  · Bacterial Infections/MRSA: No  · Anxiety: No  · Depression: No  · Suicidal or Homicidal Thoughts: No      PSYCH: Normal mood and affect  EYES: Normal conjunctiva  ENT: Normal lips and oral mucosa  CARDIOVASCULAR: No edema  RESPIRATORY: Normal respirations  HEME/LYMPH/IMMUNO:  No regional lymphadenopathy except as noted below in ASSESSMENT AND PLAN BY DIAGNOSIS    FULL ORGAN SYSTEM SKIN EXAM (SKIN)  Hair, Scalp, Ears, Face Normal except as noted below in Assessment   Neck, Cervical Chain Nodes Normal except as noted below in Assessment   Right Arm/Hand/Fingers Normal except as noted below in Assessment   Left Arm/Hand/Fingers Normal except as noted below in Assessment   Chest/Breasts/Axillae    Abdomen, Umbilicus    Back/Spine    Groin/Genitalia/Buttocks    Right Leg, Foot, Toes Normal except as noted below in Assessment   Left Leg, Foot, Toes Normal except as noted below in Assessment     1   FOLLOW UP PSORIASIS    Physical Exam:   Anatomic Location Affected:  Elbows and knees    Morphological Description:  Light pink plaque on right knee and other sites are pretty much clear    Severity: mild   Body Percent Affected: 1%   Pertinent Positives:   Pertinent Negatives:    Assessment and Plan:  Based on a thorough discussion of this condition and the management approach to it (including a comprehensive discussion of the known risks, side effects and potential benefits of treatment), the patient (family) agrees to implement the following specific plan:   Switch so you are on halobetasol two times a day on the weekends and calcipotriene two times a day on the week days      Psoriasis is a chronic inflammatory condition that causes the body to make new skin cells in days rather than weeks  As these cells pile up on the surface of the skin, you may see thick, scaly patches of thickened skin  Psoriasis affects 2-4% of males and females  It can start at any age including childhood, with peaks of onset at 15-25 years and 50-60 years  It tends to persist lifelong, fluctuating in extent and severity  It is particularly common in Caucasians but may affect people of any race  About one-third of patients with psoriasis have family members with psoriasis  Psoriasis is multifactorial  It is classified as an immune-mediated inflammatory disease (IMID)  Genetic factors are important and influence the type of psoriasis and response to treatment  What are the signs and symptoms of psoriasis? There are many different types of psoriasis that each have present uniquely  The types of psoriasis include:    Plaque psoriasis: About 80% to 90% of people who have psoriasis develop this type  When plaque psoriasis appears, you may see:  Plaque psoriasis usually presents with symmetrically distributed, red, scaly plaques with well-defined edges  The scale is typically silvery white, except in skin folds where the plaques often appear shiny and they may have a moist peeling surface  The most common sites are scalp, elbows and knees, but any part of the skin can be involved   The plaques are usually very persistent without treatment  Itch is mostly mild but may be severe in some patients, leading to scratching and lichenification (thickened leathery skin with increased skin markings)  Painful skin cracks or fissures may occur  When psoriatic plaques clear up, they may leave brown or pale marks that can be expected to fade over several months  Guttate psoriasis: When someone gets this type of psoriasis, you often see tiny bumps appear on the skin quite suddenly  The bumps tend to cover much of the torso, legs, and arms  Sometimes, the bumps also develop on the face, scalp, and ears  No matter where they appear, the bumps tend to be:    Small and scaly   [place]-colored to pink   Temporary, clearing in a few weeks or months without treatment  When guttate psoriasis clears, it may never return  Why this happens is still a bit of a mystery  Guttate psoriasis tends to develop in children and young adults who've had an infection, such as strep throat  It's possible that when the infection clears so does guttate psoriasis  It's also possible to have:   Guttate psoriasis for life   See the guttate psoriasis clear and plaque psoriasis develop later in life   Plaque psoriasis when you develop guttate psoriasis  There's no way to predict what will happen after the first flare-up of guttate psoriasis clears  Inverse psoriasis: This type of psoriasis develops in areas where skin touches skin, such as the armpits, genitals, and crease of the buttocks  Where the inverse psoriasis appears, you're likely to notice:   Smooth, red patches of skin that look raw   Little, if any, silvery-white coating   Sore or painful skin  Other names for this type of psoriasis are intertriginous psoriasis or flexural psoriasis  Pustular psoriasis: This type of psoriasis causes pus-filled bumps that usually appear only on the feet and hands  While the pus-filled bumps may look like an infection, the skin is not infected   The bumps don't contain bacteria or anything else that could cause an infection  Where pustular psoriasis appears, you tend to notice:   Red, swollen skin that is dotted with pus-filled bumps   Extremely sore or painful skin   Brown dots (and sometimes scale) appear as the pus-filled bumps dry  Pustular psoriasis can make just about any activity that requires your hands or feet, such as typing or walking, unbearably painful  Pustular psoriasis (generalized): Serious and life-threatening, this rare type of psoriasis causes pus-filled bumps to develop on much of the skin  Also called von Zumbusch psoriasis, a flare-up causes this sequence of events:  1  Skin on most of the body suddenly turns dry, red, and tender  2  Within hours, pus-filled bumps cover most of the skin  3  Often within a day, the pus-filled bumps break open and pools of pus leak onto the skin  4  As the pus dries (usually within 24 to 48 hours), the skin dries out and peels (as shown in this picture)  5  When the dried skin peels off, you see a smooth, glazed surface  6  In a few days or weeks, you may see a new crop of pus-filled bumps covering most of the skin, as the cycle repeats itself  Anyone with pustular psoriasis also feels very sick, and may develop a fever, headache, muscle weakness, and other symptoms  Medical care is often necessary to save the person's life  Erythrodermic psoriasis: Serious and life-threatening, this type of psoriasis requires immediate medical care  When someone develops erythrodermic psoriasis, you may notice:   Skin on most of the body looks burnt   Chills, fever, and the person looks extremely ill   Muscle weakness, a rapid pulse, and severe itch  The person may also be unable to keep warm, so hypothermia can set in quickly  Most people who develop this type of psoriasis already have another type of psoriasis   Before developing erythrodermic psoriasis, they often notice that their psoriasis is worsening or not improving with treatment  If you notice either of these happening, see a board-certified dermatologist     Nails    Nail psoriasis: With any type of psoriasis, you may see changes to your fingernails or toenails  About half of the people who have plaque psoriasis see signs of psoriasis on their fingernails at some point2  When psoriasis affects the nails, you may notice:   Tiny dents in your nails (called nail pits)   White, yellow, or brown discoloration under one or more nails   Crumbling, rough nails   A nail lifting up so that it's no longer attached   Buildup of skin cells beneath one or more nails, which lifts up the nail  Treatment and proper nail care can help you control nail psoriasis  Psoriatic arthritis: If you have psoriasis, it's important to pay attention to your joints  Some people who have psoriasis develop a type of arthritis called psoriatic arthritis  This is more likely to occur if you have severe psoriasis  Most people notice psoriasis on their skin years before they develop psoriatic arthritis  It's also possible to get psoriatic arthritis before psoriasis, but this is less common  When psoriatic arthritis develops, the signs can be subtle  At first, you may notice:   A swollen and tender joint, especially in a finger or toe   Heel pain   Swelling on the back of your leg, just above your heel   Stiffness in the morning that fades during the day  Like psoriasis, psoriatic arthritis cannot be cured  Treatment can prevent psoriatic arthritis from worsening, which is important  Allowed to progress, psoriatic arthritis can become disabling  Diagnosis and treatment of psoriasis   Psoriasis is usually diagnosed by clinical features, and skin biopsy if necessary  It is important to decrease factors that aggravate psoriasis   These include treating streptococcal infections, minimizing skin injuries, avoiding sun exposure if it exacerbates psoriasis, smoking, alcohol usage, decreasing stress, and maintaining an optimal body weight  Certain medications such as lithium, beta blockers, antimalarials, and NSAIDs have also been implicated  Suddenly stopping oral steroids or strong topical steroids can cause rebound disease  There are many categories of psoriasis treatments available  Topical therapy  Mild psoriasis is generally treated with topical agents alone  Which treatment is selected may depend on body site, extent and severity of psoriasis   Emollients   Coal tar preparations   Dithranol   Salicylic acid   Vitamin D analogue (calcipotriol)   Topical corticosteroids   Calcineurin inhibitor (tacrolimus, pimecrolimus)  Phototherapy  Most psoriasis centres offer phototherapy with ultraviolet (UV) radiation, often in combination with topical or systemic agents  Types of phototherapy include   Narrowband UVB   Broadband UVB   Photochemotherapy (PUVA)   Targeted phototherapy  Systemic therapy  Moderate to severe psoriasis warrants treatment with a systemic agent and/or phototherapy  The most common treatments are:   Methotrexate   Ciclosporin   Acitretin  Other medicines occasionally used for psoriasis include:   Mycophenolate   Apremilast   Hydroxyurea   Azathioprine   6-mercaptopurine  Systemic corticosteroids are best avoided due to a risk of severe withdrawal flare of psoriasis and adverse effects  Biologics or targeted therapies are reserved for conventional treatment-resistant severe psoriasis, mainly because of expense, as side effects compare favorably with other systemic agents  These include:   Anti-tumour necrosis factor-alpha antagonists (anti-TNF?) infliximab, adalimumab and etanercept   The interleukin (IL)-12/23 antagonist ustekinumab   IL-17 antagonists such as secukinumab  Many other monoclonal antibodies are under investigation in the treatment of psoriasis    Scribe Attestation    I,:   Alvaro Constantino MA am acting as a scribe while in the presence of the attending physician :        I,:   Elia Ahumada MD personally performed the services described in this documentation    as scribed in my presence :

## 2020-08-04 NOTE — PATIENT INSTRUCTIONS
Assessment and Plan:  Based on a thorough discussion of this condition and the management approach to it (including a comprehensive discussion of the known risks, side effects and potential benefits of treatment), the patient (family) agrees to implement the following specific plan:   Switch so you are on halobetasol two times a day on the weekends and calcipotriene two times a day on the week days      Psoriasis is a chronic inflammatory condition that causes the body to make new skin cells in days rather than weeks  As these cells pile up on the surface of the skin, you may see thick, scaly patches of thickened skin  Psoriasis affects 2-4% of males and females  It can start at any age including childhood, with peaks of onset at 15-25 years and 50-60 years  It tends to persist lifelong, fluctuating in extent and severity  It is particularly common in Caucasians but may affect people of any race  About one-third of patients with psoriasis have family members with psoriasis  Psoriasis is multifactorial  It is classified as an immune-mediated inflammatory disease (IMID)  Genetic factors are important and influence the type of psoriasis and response to treatment  What are the signs and symptoms of psoriasis? There are many different types of psoriasis that each have present uniquely  The types of psoriasis include:    Plaque psoriasis: About 80% to 90% of people who have psoriasis develop this type  When plaque psoriasis appears, you may see:  Plaque psoriasis usually presents with symmetrically distributed, red, scaly plaques with well-defined edges  The scale is typically silvery white, except in skin folds where the plaques often appear shiny and they may have a moist peeling surface  The most common sites are scalp, elbows and knees, but any part of the skin can be involved  The plaques are usually very persistent without treatment    Itch is mostly mild but may be severe in some patients, leading to scratching and lichenification (thickened leathery skin with increased skin markings)  Painful skin cracks or fissures may occur  When psoriatic plaques clear up, they may leave brown or pale marks that can be expected to fade over several months  Guttate psoriasis: When someone gets this type of psoriasis, you often see tiny bumps appear on the skin quite suddenly  The bumps tend to cover much of the torso, legs, and arms  Sometimes, the bumps also develop on the face, scalp, and ears  No matter where they appear, the bumps tend to be:    Small and scaly   [place]-colored to pink   Temporary, clearing in a few weeks or months without treatment  When guttate psoriasis clears, it may never return  Why this happens is still a bit of a mystery  Guttate psoriasis tends to develop in children and young adults who've had an infection, such as strep throat  It's possible that when the infection clears so does guttate psoriasis  It's also possible to have:   Guttate psoriasis for life   See the guttate psoriasis clear and plaque psoriasis develop later in life   Plaque psoriasis when you develop guttate psoriasis  There's no way to predict what will happen after the first flare-up of guttate psoriasis clears  Inverse psoriasis: This type of psoriasis develops in areas where skin touches skin, such as the armpits, genitals, and crease of the buttocks  Where the inverse psoriasis appears, you're likely to notice:   Smooth, red patches of skin that look raw   Little, if any, silvery-white coating   Sore or painful skin  Other names for this type of psoriasis are intertriginous psoriasis or flexural psoriasis  Pustular psoriasis: This type of psoriasis causes pus-filled bumps that usually appear only on the feet and hands  While the pus-filled bumps may look like an infection, the skin is not infected  The bumps don't contain bacteria or anything else that could cause an infection    Where pustular psoriasis appears, you tend to notice:   Red, swollen skin that is dotted with pus-filled bumps   Extremely sore or painful skin   Brown dots (and sometimes scale) appear as the pus-filled bumps dry  Pustular psoriasis can make just about any activity that requires your hands or feet, such as typing or walking, unbearably painful  Pustular psoriasis (generalized): Serious and life-threatening, this rare type of psoriasis causes pus-filled bumps to develop on much of the skin  Also called von Zumbusch psoriasis, a flare-up causes this sequence of events:  1  Skin on most of the body suddenly turns dry, red, and tender  2  Within hours, pus-filled bumps cover most of the skin  3  Often within a day, the pus-filled bumps break open and pools of pus leak onto the skin  4  As the pus dries (usually within 24 to 48 hours), the skin dries out and peels (as shown in this picture)  5  When the dried skin peels off, you see a smooth, glazed surface  6  In a few days or weeks, you may see a new crop of pus-filled bumps covering most of the skin, as the cycle repeats itself  Anyone with pustular psoriasis also feels very sick, and may develop a fever, headache, muscle weakness, and other symptoms  Medical care is often necessary to save the person's life  Erythrodermic psoriasis: Serious and life-threatening, this type of psoriasis requires immediate medical care  When someone develops erythrodermic psoriasis, you may notice:   Skin on most of the body looks burnt   Chills, fever, and the person looks extremely ill   Muscle weakness, a rapid pulse, and severe itch  The person may also be unable to keep warm, so hypothermia can set in quickly  Most people who develop this type of psoriasis already have another type of psoriasis  Before developing erythrodermic psoriasis, they often notice that their psoriasis is worsening or not improving with treatment   If you notice either of these happening, see a board-certified dermatologist     Nails    Nail psoriasis: With any type of psoriasis, you may see changes to your fingernails or toenails  About half of the people who have plaque psoriasis see signs of psoriasis on their fingernails at some point2  When psoriasis affects the nails, you may notice:   Tiny dents in your nails (called nail pits)   White, yellow, or brown discoloration under one or more nails   Crumbling, rough nails   A nail lifting up so that it's no longer attached   Buildup of skin cells beneath one or more nails, which lifts up the nail  Treatment and proper nail care can help you control nail psoriasis  Psoriatic arthritis: If you have psoriasis, it's important to pay attention to your joints  Some people who have psoriasis develop a type of arthritis called psoriatic arthritis  This is more likely to occur if you have severe psoriasis  Most people notice psoriasis on their skin years before they develop psoriatic arthritis  It's also possible to get psoriatic arthritis before psoriasis, but this is less common  When psoriatic arthritis develops, the signs can be subtle  At first, you may notice:   A swollen and tender joint, especially in a finger or toe   Heel pain   Swelling on the back of your leg, just above your heel   Stiffness in the morning that fades during the day  Like psoriasis, psoriatic arthritis cannot be cured  Treatment can prevent psoriatic arthritis from worsening, which is important  Allowed to progress, psoriatic arthritis can become disabling  Diagnosis and treatment of psoriasis   Psoriasis is usually diagnosed by clinical features, and skin biopsy if necessary  It is important to decrease factors that aggravate psoriasis  These include treating streptococcal infections, minimizing skin injuries, avoiding sun exposure if it exacerbates psoriasis, smoking, alcohol usage, decreasing stress, and maintaining an optimal body weight   Certain medications such as lithium, beta blockers, antimalarials, and NSAIDs have also been implicated  Suddenly stopping oral steroids or strong topical steroids can cause rebound disease  There are many categories of psoriasis treatments available  Topical therapy  Mild psoriasis is generally treated with topical agents alone  Which treatment is selected may depend on body site, extent and severity of psoriasis   Emollients   Coal tar preparations   Dithranol   Salicylic acid   Vitamin D analogue (calcipotriol)   Topical corticosteroids   Calcineurin inhibitor (tacrolimus, pimecrolimus)  Phototherapy  Most psoriasis centres offer phototherapy with ultraviolet (UV) radiation, often in combination with topical or systemic agents  Types of phototherapy include   Narrowband UVB   Broadband UVB   Photochemotherapy (PUVA)   Targeted phototherapy  Systemic therapy  Moderate to severe psoriasis warrants treatment with a systemic agent and/or phototherapy  The most common treatments are:   Methotrexate   Ciclosporin   Acitretin  Other medicines occasionally used for psoriasis include:   Mycophenolate   Apremilast   Hydroxyurea   Azathioprine   6-mercaptopurine  Systemic corticosteroids are best avoided due to a risk of severe withdrawal flare of psoriasis and adverse effects  Biologics or targeted therapies are reserved for conventional treatment-resistant severe psoriasis, mainly because of expense, as side effects compare favorably with other systemic agents  These include:   Anti-tumour necrosis factor-alpha antagonists (anti-TNF?) infliximab, adalimumab and etanercept   The interleukin (IL)-12/23 antagonist ustekinumab   IL-17 antagonists such as secukinumab  Many other monoclonal antibodies are under investigation in the treatment of psoriasis